# Patient Record
Sex: MALE | Race: WHITE | NOT HISPANIC OR LATINO | URBAN - METROPOLITAN AREA
[De-identification: names, ages, dates, MRNs, and addresses within clinical notes are randomized per-mention and may not be internally consistent; named-entity substitution may affect disease eponyms.]

---

## 2022-10-25 ENCOUNTER — OFFICE (OUTPATIENT)
Dept: URBAN - METROPOLITAN AREA CLINIC 13 | Facility: CLINIC | Age: 82
End: 2022-10-25

## 2022-10-25 VITALS
HEART RATE: 77 BPM | SYSTOLIC BLOOD PRESSURE: 140 MMHG | WEIGHT: 142 LBS | DIASTOLIC BLOOD PRESSURE: 92 MMHG | HEIGHT: 65 IN

## 2022-10-25 DIAGNOSIS — D64.9 ANEMIA, UNSPECIFIED: ICD-10-CM

## 2022-10-25 PROCEDURE — 99204 OFFICE O/P NEW MOD 45 MIN: CPT | Performed by: INTERNAL MEDICINE

## 2022-10-26 LAB
CBC, PLATELET CT  AND  DIFF: ABS BASOPHIL: 0.1 K/UL
CBC, PLATELET CT  AND  DIFF: ABS EOSINOPHIL: 0.2 K/UL
CBC, PLATELET CT  AND  DIFF: ABS IMMATURE GRANS: 0 K/UL
CBC, PLATELET CT  AND  DIFF: ABS LYMPHOCYTE: 3.2 K/UL
CBC, PLATELET CT  AND  DIFF: ABS MONOCYTE: 0.8 K/UL
CBC, PLATELET CT  AND  DIFF: ABS NEUTROPHIL: 6.9 K/UL
CBC, PLATELET CT  AND  DIFF: BASOPHIL: 0.5 %
CBC, PLATELET CT  AND  DIFF: DIFFERENTIAL: (no result)
CBC, PLATELET CT  AND  DIFF: EOSINOPHIL: 1.5 %
CBC, PLATELET CT  AND  DIFF: HEMATOCRIT: 34.1 % — LOW
CBC, PLATELET CT  AND  DIFF: HEMOGLOBIN: 11.2 G/DL — LOW
CBC, PLATELET CT  AND  DIFF: IMMATURE GRANULOCYTES: 0.3 %
CBC, PLATELET CT  AND  DIFF: LYMPHOCYTE: 28.9 %
CBC, PLATELET CT  AND  DIFF: MCH: 28.9 PG
CBC, PLATELET CT  AND  DIFF: MCHC: 32.8 G/DL
CBC, PLATELET CT  AND  DIFF: MCV: 88.1 FL
CBC, PLATELET CT  AND  DIFF: MONOCYTE: 7.2 %
CBC, PLATELET CT  AND  DIFF: MPV: 10.4 FL
CBC, PLATELET CT  AND  DIFF: NEUTROPHIL: 61.6 %
CBC, PLATELET CT  AND  DIFF: NRBCS: 0 /100 WBC
CBC, PLATELET CT  AND  DIFF: PLATELET COUNT: 272 K/UL
CBC, PLATELET CT  AND  DIFF: RBC: 3.87 M/UL — LOW
CBC, PLATELET CT  AND  DIFF: RDW: 15.9 % — HIGH
CBC, PLATELET CT  AND  DIFF: WBC COUNT: 11.2 K/UL — HIGH
COMPREHENSIVE METABOLIC PANEL: A/G RATIO: 1.1 RATIO
COMPREHENSIVE METABOLIC PANEL: ALBUMIN: 3.9 G/DL
COMPREHENSIVE METABOLIC PANEL: ALK PHOSPHATASE: 118 U/L
COMPREHENSIVE METABOLIC PANEL: ALT: 21 U/L
COMPREHENSIVE METABOLIC PANEL: AST: 22 U/L
COMPREHENSIVE METABOLIC PANEL: BILIRUBIN,TOTAL: 0.2 MG/DL
COMPREHENSIVE METABOLIC PANEL: BLOOD UREA NITROGEN: 31 MG/DL — HIGH
COMPREHENSIVE METABOLIC PANEL: BUN/CREAT RATIO: 19
COMPREHENSIVE METABOLIC PANEL: CALCIUM: 9 MG/DL
COMPREHENSIVE METABOLIC PANEL: CHLORIDE: 98 MEQ/L
COMPREHENSIVE METABOLIC PANEL: CO2: 27 MEQ/L
COMPREHENSIVE METABOLIC PANEL: CREATININE: 1.6 MG/DL — HIGH
COMPREHENSIVE METABOLIC PANEL: FASTING STATUS: (no result)
COMPREHENSIVE METABOLIC PANEL: GLOBULIN: 3.6 G/DL
COMPREHENSIVE METABOLIC PANEL: GLOMERULAR FILTRATION RATE (GFR): 42 MLS/MIN/1.73M2 — LOW
COMPREHENSIVE METABOLIC PANEL: GLUCOSE,RANDOM: 84 MG/DL
COMPREHENSIVE METABOLIC PANEL: POTASSIUM: 4.5 MEQ/L
COMPREHENSIVE METABOLIC PANEL: SODIUM: 137 MEQ/L
COMPREHENSIVE METABOLIC PANEL: TOTAL PROTEIN: 7.5 G/DL
IRON PROFILE: IRON BINDING CAPACITY: 234 MCG/DL
IRON PROFILE: IRON SATURATION: 16 %
IRON PROFILE: IRON: 38 MCG/DL — LOW

## 2022-11-29 ENCOUNTER — ON CAMPUS - OUTPATIENT (OUTPATIENT)
Dept: URBAN - METROPOLITAN AREA HOSPITAL 139 | Facility: HOSPITAL | Age: 82
End: 2022-11-29
Payer: MEDICARE

## 2022-11-29 DIAGNOSIS — D64.9 ANEMIA, UNSPECIFIED: ICD-10-CM

## 2022-11-29 DIAGNOSIS — K29.50 UNSPECIFIED CHRONIC GASTRITIS WITHOUT BLEEDING: ICD-10-CM

## 2022-11-29 DIAGNOSIS — K63.5 POLYP OF COLON: ICD-10-CM

## 2022-11-29 DIAGNOSIS — K57.30 DIVERTICULOSIS OF LARGE INTESTINE WITHOUT PERFORATION OR ABS: ICD-10-CM

## 2022-11-29 DIAGNOSIS — K64.8 OTHER HEMORRHOIDS: ICD-10-CM

## 2022-11-29 PROCEDURE — 43239 EGD BIOPSY SINGLE/MULTIPLE: CPT | Performed by: INTERNAL MEDICINE

## 2022-11-29 PROCEDURE — 45380 COLONOSCOPY AND BIOPSY: CPT | Mod: XS | Performed by: INTERNAL MEDICINE

## 2022-11-29 PROCEDURE — 45385 COLONOSCOPY W/LESION REMOVAL: CPT | Performed by: INTERNAL MEDICINE

## 2023-01-24 ENCOUNTER — OFFICE (OUTPATIENT)
Dept: URBAN - METROPOLITAN AREA CLINIC 13 | Facility: CLINIC | Age: 83
End: 2023-01-24
Payer: MEDICARE

## 2023-01-24 VITALS
HEIGHT: 65 IN | DIASTOLIC BLOOD PRESSURE: 84 MMHG | HEART RATE: 66 BPM | SYSTOLIC BLOOD PRESSURE: 142 MMHG | WEIGHT: 150 LBS

## 2023-01-24 DIAGNOSIS — D64.9 ANEMIA, UNSPECIFIED: ICD-10-CM

## 2023-01-24 LAB
CBC, PLATELET CT  AND  DIFF: ABS BASOPHIL: 0.1 K/UL
CBC, PLATELET CT  AND  DIFF: ABS EOSINOPHIL: 0.3 K/UL
CBC, PLATELET CT  AND  DIFF: ABS IMMATURE GRANS: 0 K/UL
CBC, PLATELET CT  AND  DIFF: ABS LYMPHOCYTE: 2.3 K/UL
CBC, PLATELET CT  AND  DIFF: ABS MONOCYTE: 0.9 K/UL
CBC, PLATELET CT  AND  DIFF: ABS NEUTROPHIL: 6.6 K/UL
CBC, PLATELET CT  AND  DIFF: BASOPHIL: 0.5 %
CBC, PLATELET CT  AND  DIFF: DIFFERENTIAL: (no result)
CBC, PLATELET CT  AND  DIFF: EOSINOPHIL: 3.1 %
CBC, PLATELET CT  AND  DIFF: HEMATOCRIT: 30.8 % — LOW
CBC, PLATELET CT  AND  DIFF: HEMOGLOBIN: 10.3 G/DL — LOW
CBC, PLATELET CT  AND  DIFF: IMMATURE GRANULOCYTES: 0.3 %
CBC, PLATELET CT  AND  DIFF: LYMPHOCYTE: 22.6 %
CBC, PLATELET CT  AND  DIFF: MCH: 29.3 PG
CBC, PLATELET CT  AND  DIFF: MCHC: 33.4 G/DL
CBC, PLATELET CT  AND  DIFF: MCV: 87.5 FL
CBC, PLATELET CT  AND  DIFF: MONOCYTE: 9.1 %
CBC, PLATELET CT  AND  DIFF: MPV: 10.5 FL
CBC, PLATELET CT  AND  DIFF: NEUTROPHIL: 64.4 %
CBC, PLATELET CT  AND  DIFF: NRBCS: 0 /100 WBC
CBC, PLATELET CT  AND  DIFF: PLATELET COUNT: 294 K/UL
CBC, PLATELET CT  AND  DIFF: RBC: 3.52 M/UL — LOW
CBC, PLATELET CT  AND  DIFF: RDW: 13.1 %
CBC, PLATELET CT  AND  DIFF: WBC COUNT: 10.2 K/UL
IRON PROFILE: IRON BINDING CAPACITY: 220 MCG/DL
IRON PROFILE: IRON SATURATION: 14 %
IRON PROFILE: IRON: 30 MCG/DL — LOW

## 2023-01-24 PROCEDURE — 99212 OFFICE O/P EST SF 10 MIN: CPT | Performed by: NURSE PRACTITIONER

## 2023-02-24 ENCOUNTER — OFFICE (OUTPATIENT)
Dept: URBAN - METROPOLITAN AREA CLINIC 13 | Facility: CLINIC | Age: 83
End: 2023-02-24
Payer: MEDICARE

## 2023-02-24 VITALS
HEART RATE: 58 BPM | HEIGHT: 65 IN | WEIGHT: 153 LBS | SYSTOLIC BLOOD PRESSURE: 152 MMHG | DIASTOLIC BLOOD PRESSURE: 82 MMHG

## 2023-02-24 DIAGNOSIS — D64.9 ANEMIA, UNSPECIFIED: ICD-10-CM

## 2023-02-24 DIAGNOSIS — R19.7 DIARRHEA, UNSPECIFIED: ICD-10-CM

## 2023-02-24 PROCEDURE — 99212 OFFICE O/P EST SF 10 MIN: CPT | Performed by: NURSE PRACTITIONER

## 2023-05-10 LAB
CBC, PLATELET CT  AND  DIFF: ABS BASOPHIL: 0 K/UL
CBC, PLATELET CT  AND  DIFF: ABS EOSINOPHIL: 0.2 K/UL
CBC, PLATELET CT  AND  DIFF: ABS IMMATURE GRANS: 0 K/UL
CBC, PLATELET CT  AND  DIFF: ABS LYMPHOCYTE: 3 K/UL
CBC, PLATELET CT  AND  DIFF: ABS MONOCYTE: 0.7 K/UL
CBC, PLATELET CT  AND  DIFF: ABS NEUTROPHIL: 6 K/UL
CBC, PLATELET CT  AND  DIFF: BASOPHIL: 0.3 %
CBC, PLATELET CT  AND  DIFF: DIFFERENTIAL: (no result)
CBC, PLATELET CT  AND  DIFF: EOSINOPHIL: 2.2 %
CBC, PLATELET CT  AND  DIFF: HEMATOCRIT: 34.6 % — LOW
CBC, PLATELET CT  AND  DIFF: HEMOGLOBIN: 11.6 G/DL — LOW
CBC, PLATELET CT  AND  DIFF: IMMATURE GRANULOCYTES: 0.3 %
CBC, PLATELET CT  AND  DIFF: LYMPHOCYTE: 30.2 %
CBC, PLATELET CT  AND  DIFF: MCH: 30.4 PG
CBC, PLATELET CT  AND  DIFF: MCHC: 33.5 G/DL
CBC, PLATELET CT  AND  DIFF: MCV: 90.8 FL
CBC, PLATELET CT  AND  DIFF: MONOCYTE: 7.1 %
CBC, PLATELET CT  AND  DIFF: MPV: 10.5 FL
CBC, PLATELET CT  AND  DIFF: NEUTROPHIL: 59.9 %
CBC, PLATELET CT  AND  DIFF: NRBCS: 0 /100 WBC
CBC, PLATELET CT  AND  DIFF: PLATELET COUNT: 257 K/UL
CBC, PLATELET CT  AND  DIFF: RBC: 3.81 M/UL — LOW
CBC, PLATELET CT  AND  DIFF: RDW: 13 %
CBC, PLATELET CT  AND  DIFF: WBC COUNT: 10.1 K/UL
Lab: (no result)

## 2023-05-24 ENCOUNTER — OFFICE (OUTPATIENT)
Dept: URBAN - METROPOLITAN AREA CLINIC 13 | Facility: CLINIC | Age: 83
End: 2023-05-24
Payer: MEDICARE

## 2023-05-24 VITALS
HEIGHT: 65 IN | HEART RATE: 71 BPM | WEIGHT: 158 LBS | SYSTOLIC BLOOD PRESSURE: 138 MMHG | DIASTOLIC BLOOD PRESSURE: 80 MMHG

## 2023-05-24 DIAGNOSIS — K59.00 CONSTIPATION, UNSPECIFIED: ICD-10-CM

## 2023-05-24 DIAGNOSIS — D64.9 ANEMIA, UNSPECIFIED: ICD-10-CM

## 2023-05-24 PROCEDURE — 99212 OFFICE O/P EST SF 10 MIN: CPT | Performed by: NURSE PRACTITIONER

## 2023-08-25 LAB
Lab: 299 PG/ML — HIGH
Lab: 787 PG/ML — HIGH
Lab: 85 NG/ML
Lab: <2 PG/ML

## 2023-08-29 ENCOUNTER — OFFICE (OUTPATIENT)
Dept: URBAN - METROPOLITAN AREA CLINIC 13 | Facility: CLINIC | Age: 83
End: 2023-08-29
Payer: MEDICARE

## 2023-08-29 VITALS
HEART RATE: 64 BPM | DIASTOLIC BLOOD PRESSURE: 66 MMHG | SYSTOLIC BLOOD PRESSURE: 122 MMHG | WEIGHT: 154 LBS | HEIGHT: 65 IN

## 2023-08-29 DIAGNOSIS — K59.00 CONSTIPATION, UNSPECIFIED: ICD-10-CM

## 2023-08-29 DIAGNOSIS — D64.9 ANEMIA, UNSPECIFIED: ICD-10-CM

## 2023-08-29 LAB
CBC, PLATELET CT  AND  DIFF: ABS BASOPHIL: 0.1 K/UL
CBC, PLATELET CT  AND  DIFF: ABS EOSINOPHIL: 0.2 K/UL
CBC, PLATELET CT  AND  DIFF: ABS IMMATURE GRANS: 0.1 K/UL
CBC, PLATELET CT  AND  DIFF: ABS LYMPHOCYTE: 2.7 K/UL
CBC, PLATELET CT  AND  DIFF: ABS MONOCYTE: 1 K/UL
CBC, PLATELET CT  AND  DIFF: ABS NEUTROPHIL: 10.8 K/UL — HIGH
CBC, PLATELET CT  AND  DIFF: BASOPHIL: 0.4 %
CBC, PLATELET CT  AND  DIFF: DIFFERENTIAL: (no result)
CBC, PLATELET CT  AND  DIFF: EOSINOPHIL: 1.3 %
CBC, PLATELET CT  AND  DIFF: HEMATOCRIT: 33.3 % — LOW
CBC, PLATELET CT  AND  DIFF: HEMOGLOBIN: 11.1 G/DL — LOW
CBC, PLATELET CT  AND  DIFF: IMMATURE GRANULOCYTES: 0.4 %
CBC, PLATELET CT  AND  DIFF: LYMPHOCYTE: 18.1 %
CBC, PLATELET CT  AND  DIFF: MCH: 30.3 PG
CBC, PLATELET CT  AND  DIFF: MCHC: 33.3 G/DL
CBC, PLATELET CT  AND  DIFF: MCV: 91 FL
CBC, PLATELET CT  AND  DIFF: MONOCYTE: 6.7 %
CBC, PLATELET CT  AND  DIFF: MPV: 10.3 FL
CBC, PLATELET CT  AND  DIFF: NEUTROPHIL: 73.1 %
CBC, PLATELET CT  AND  DIFF: NRBCS: 0 /100 WBC
CBC, PLATELET CT  AND  DIFF: PLATELET COUNT: 250 K/UL
CBC, PLATELET CT  AND  DIFF: RBC: 3.66 M/UL — LOW
CBC, PLATELET CT  AND  DIFF: RDW: 12.9 %
CBC, PLATELET CT  AND  DIFF: WBC COUNT: 14.8 K/UL — HIGH
IRON PROFILE: IRON BINDING CAPACITY: 263 MCG/DL
IRON PROFILE: IRON SATURATION: 13 %
IRON PROFILE: IRON: 34 MCG/DL — LOW

## 2023-08-29 PROCEDURE — 99212 OFFICE O/P EST SF 10 MIN: CPT | Performed by: NURSE PRACTITIONER

## 2024-02-29 ENCOUNTER — OFFICE (OUTPATIENT)
Dept: URBAN - METROPOLITAN AREA CLINIC 13 | Facility: CLINIC | Age: 84
End: 2024-02-29

## 2024-02-29 VITALS
HEIGHT: 65 IN | SYSTOLIC BLOOD PRESSURE: 120 MMHG | DIASTOLIC BLOOD PRESSURE: 76 MMHG | OXYGEN SATURATION: 98 % | WEIGHT: 155 LBS | HEART RATE: 73 BPM

## 2024-02-29 DIAGNOSIS — D64.9 ANEMIA, UNSPECIFIED: ICD-10-CM

## 2024-02-29 PROCEDURE — 99214 OFFICE O/P EST MOD 30 MIN: CPT | Performed by: INTERNAL MEDICINE

## 2024-07-18 LAB
CBC, PLATELET CT  AND  DIFF: ABS BASOPHIL: 0.1 K/UL
CBC, PLATELET CT  AND  DIFF: ABS EOSINOPHIL: 0.2 K/UL
CBC, PLATELET CT  AND  DIFF: ABS IMMATURE GRANS: 0.1 K/UL
CBC, PLATELET CT  AND  DIFF: ABS LYMPHOCYTE: 2.7 K/UL
CBC, PLATELET CT  AND  DIFF: ABS MONOCYTE: 0.8 K/UL
CBC, PLATELET CT  AND  DIFF: ABS NEUTROPHIL: 7.2 K/UL
CBC, PLATELET CT  AND  DIFF: BASOPHIL: 0.5 %
CBC, PLATELET CT  AND  DIFF: DIFFERENTIAL: (no result)
CBC, PLATELET CT  AND  DIFF: EOSINOPHIL: 1.8 %
CBC, PLATELET CT  AND  DIFF: HEMATOCRIT: 32.9 % — LOW
CBC, PLATELET CT  AND  DIFF: HEMOGLOBIN: 10.7 G/DL — LOW
CBC, PLATELET CT  AND  DIFF: IMMATURE GRANULOCYTES: 0.5 %
CBC, PLATELET CT  AND  DIFF: LYMPHOCYTE: 24.8 %
CBC, PLATELET CT  AND  DIFF: MCH: 30.8 PG
CBC, PLATELET CT  AND  DIFF: MCHC: 32.5 G/DL
CBC, PLATELET CT  AND  DIFF: MCV: 94.8 FL
CBC, PLATELET CT  AND  DIFF: MONOCYTE: 7.4 %
CBC, PLATELET CT  AND  DIFF: MPV: 10.4 FL
CBC, PLATELET CT  AND  DIFF: NEUTROPHIL: 65 %
CBC, PLATELET CT  AND  DIFF: NRBCS: 0 /100 WBC
CBC, PLATELET CT  AND  DIFF: PLATELET COUNT: 247 K/UL
CBC, PLATELET CT  AND  DIFF: RBC: 3.47 M/UL — LOW
CBC, PLATELET CT  AND  DIFF: RDW: 13.1 %
CBC, PLATELET CT  AND  DIFF: WBC COUNT: 11 K/UL — HIGH
IRON PROFILE: IRON BINDING CAPACITY: 277 MCG/DL
IRON PROFILE: IRON SATURATION: 23 %
IRON PROFILE: IRON: 63 MCG/DL

## 2024-07-29 ENCOUNTER — OFFICE (OUTPATIENT)
Dept: URBAN - METROPOLITAN AREA CLINIC 13 | Facility: CLINIC | Age: 84
End: 2024-07-29

## 2024-07-29 VITALS
HEART RATE: 71 BPM | DIASTOLIC BLOOD PRESSURE: 70 MMHG | HEIGHT: 65 IN | SYSTOLIC BLOOD PRESSURE: 130 MMHG | WEIGHT: 155 LBS

## 2024-07-29 DIAGNOSIS — D50.0 IRON DEFICIENCY ANEMIA SECONDARY TO BLOOD LOSS (CHRONIC): ICD-10-CM

## 2024-07-29 PROCEDURE — 99214 OFFICE O/P EST MOD 30 MIN: CPT | Performed by: INTERNAL MEDICINE

## 2025-02-26 ENCOUNTER — OFFICE (OUTPATIENT)
Dept: URBAN - METROPOLITAN AREA CLINIC 13 | Facility: CLINIC | Age: 85
End: 2025-02-26

## 2025-02-26 VITALS
WEIGHT: 151 LBS | OXYGEN SATURATION: 96 % | HEIGHT: 65 IN | SYSTOLIC BLOOD PRESSURE: 120 MMHG | HEART RATE: 68 BPM | DIASTOLIC BLOOD PRESSURE: 80 MMHG

## 2025-02-26 DIAGNOSIS — R19.7 DIARRHEA, UNSPECIFIED: ICD-10-CM

## 2025-02-26 PROCEDURE — 99214 OFFICE O/P EST MOD 30 MIN: CPT | Performed by: INTERNAL MEDICINE

## 2025-03-19 ENCOUNTER — AMBULATORY SURGICAL CENTER (OUTPATIENT)
Dept: URBAN - METROPOLITAN AREA SURGERY 4 | Facility: SURGERY | Age: 85
End: 2025-03-19
Payer: MEDICARE

## 2025-03-19 VITALS
OXYGEN SATURATION: 97 % | WEIGHT: 150 LBS | SYSTOLIC BLOOD PRESSURE: 129 MMHG | HEIGHT: 65 IN | DIASTOLIC BLOOD PRESSURE: 55 MMHG | HEART RATE: 57 BPM | TEMPERATURE: 97.9 F | HEART RATE: 74 BPM | RESPIRATION RATE: 23 BRPM | HEART RATE: 69 BPM | OXYGEN SATURATION: 92 % | SYSTOLIC BLOOD PRESSURE: 138 MMHG | SYSTOLIC BLOOD PRESSURE: 143 MMHG | HEART RATE: 63 BPM | DIASTOLIC BLOOD PRESSURE: 60 MMHG | DIASTOLIC BLOOD PRESSURE: 46 MMHG | DIASTOLIC BLOOD PRESSURE: 67 MMHG | DIASTOLIC BLOOD PRESSURE: 66 MMHG | RESPIRATION RATE: 17 BRPM | OXYGEN SATURATION: 99 % | RESPIRATION RATE: 25 BRPM | RESPIRATION RATE: 24 BRPM | HEART RATE: 65 BPM | SYSTOLIC BLOOD PRESSURE: 139 MMHG | HEART RATE: 52 BPM | SYSTOLIC BLOOD PRESSURE: 115 MMHG | SYSTOLIC BLOOD PRESSURE: 120 MMHG | DIASTOLIC BLOOD PRESSURE: 78 MMHG | HEART RATE: 58 BPM | RESPIRATION RATE: 31 BRPM | RESPIRATION RATE: 16 BRPM | OXYGEN SATURATION: 96 % | OXYGEN SATURATION: 98 % | DIASTOLIC BLOOD PRESSURE: 72 MMHG

## 2025-03-19 DIAGNOSIS — K57.30 DIVERTICULOSIS OF LARGE INTESTINE WITHOUT PERFORATION OR ABS: ICD-10-CM

## 2025-03-19 DIAGNOSIS — R19.7 DIARRHEA, UNSPECIFIED: ICD-10-CM

## 2025-03-19 PROCEDURE — 45331 SIGMOIDOSCOPY AND BIOPSY: CPT | Performed by: INTERNAL MEDICINE

## 2025-03-19 NOTE — SERVICENOTES
There appeared to be a fixed sigmoid loop but with careful manipulation the endoscope was able to reach the splenic [email or web address]

## 2025-05-07 ENCOUNTER — OFFICE (OUTPATIENT)
Dept: URBAN - METROPOLITAN AREA CLINIC 13 | Facility: CLINIC | Age: 85
End: 2025-05-07
Payer: MEDICARE

## 2025-05-07 VITALS
HEIGHT: 65 IN | DIASTOLIC BLOOD PRESSURE: 70 MMHG | HEART RATE: 71 BPM | WEIGHT: 146 LBS | SYSTOLIC BLOOD PRESSURE: 118 MMHG

## 2025-05-07 DIAGNOSIS — K57.30 DIVERTICULOSIS OF LARGE INTESTINE WITHOUT PERFORATION OR ABS: ICD-10-CM

## 2025-05-07 DIAGNOSIS — K59.00 CONSTIPATION, UNSPECIFIED: ICD-10-CM

## 2025-05-07 DIAGNOSIS — R19.7 DIARRHEA, UNSPECIFIED: ICD-10-CM

## 2025-05-07 PROCEDURE — 99213 OFFICE O/P EST LOW 20 MIN: CPT | Performed by: INTERNAL MEDICINE
